# Patient Record
Sex: MALE | Race: OTHER
[De-identification: names, ages, dates, MRNs, and addresses within clinical notes are randomized per-mention and may not be internally consistent; named-entity substitution may affect disease eponyms.]

---

## 2018-09-21 PROCEDURE — 0FT44ZZ RESECTION OF GALLBLADDER, PERCUTANEOUS ENDOSCOPIC APPROACH: ICD-10-PCS | Performed by: SURGERY

## 2018-09-22 ENCOUNTER — HOSPITAL ENCOUNTER (INPATIENT)
Dept: HOSPITAL 54 - ER | Age: 28
LOS: 3 days | Discharge: HOME | DRG: 418 | End: 2018-09-25
Attending: INTERNAL MEDICINE | Admitting: INTERNAL MEDICINE
Payer: SELF-PAY

## 2018-09-22 VITALS — DIASTOLIC BLOOD PRESSURE: 65 MMHG | SYSTOLIC BLOOD PRESSURE: 128 MMHG

## 2018-09-22 VITALS — HEIGHT: 70 IN | WEIGHT: 270 LBS | BODY MASS INDEX: 38.65 KG/M2

## 2018-09-22 VITALS — SYSTOLIC BLOOD PRESSURE: 114 MMHG | DIASTOLIC BLOOD PRESSURE: 89 MMHG

## 2018-09-22 VITALS — DIASTOLIC BLOOD PRESSURE: 74 MMHG | SYSTOLIC BLOOD PRESSURE: 152 MMHG

## 2018-09-22 DIAGNOSIS — E87.6: ICD-10-CM

## 2018-09-22 DIAGNOSIS — I97.52: ICD-10-CM

## 2018-09-22 DIAGNOSIS — D72.829: ICD-10-CM

## 2018-09-22 DIAGNOSIS — K76.0: ICD-10-CM

## 2018-09-22 DIAGNOSIS — E66.9: ICD-10-CM
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
## 2018-09-22 DIAGNOSIS — K80.00: Primary | ICD-10-CM
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
## 2018-09-22 LAB
ALBUMIN SERPL BCP-MCNC: 4.2 G/DL (ref 3.4–5)
ALP SERPL-CCNC: 71 U/L (ref 46–116)
ALT SERPL W P-5'-P-CCNC: 42 U/L (ref 12–78)
APTT PPP: 22 SEC (ref 23–34)
AST SERPL W P-5'-P-CCNC: 18 U/L (ref 15–37)
BASOPHILS # BLD AUTO: 0.2 /CMM (ref 0–0.2)
BASOPHILS NFR BLD AUTO: 1.7 % (ref 0–2)
BILIRUB DIRECT SERPL-MCNC: 0.1 MG/DL (ref 0–0.2)
BILIRUB SERPL-MCNC: 0.8 MG/DL (ref 0.2–1)
BILIRUB UR QL STRIP: (no result)
BUN SERPL-MCNC: 16 MG/DL (ref 7–18)
CALCIUM SERPL-MCNC: 10 MG/DL (ref 8.5–10.1)
CHLORIDE SERPL-SCNC: 102 MMOL/L (ref 98–107)
CO2 SERPL-SCNC: 24 MMOL/L (ref 21–32)
COLOR UR: (no result)
CREAT SERPL-MCNC: 1.2 MG/DL (ref 0.6–1.3)
EOSINOPHIL NFR BLD AUTO: 0.1 % (ref 0–6)
GLUCOSE SERPL-MCNC: 130 MG/DL (ref 74–106)
HCT VFR BLD AUTO: 45 % (ref 39–51)
HGB BLD-MCNC: 14.9 G/DL (ref 13.5–17.5)
INR PPP: 0.95 (ref 0.85–1.15)
KETONES UR STRIP-MCNC: >=160 MG/DL
LIPASE SERPL-CCNC: 102 U/L (ref 73–393)
LYMPHOCYTES NFR BLD AUTO: 1.3 /CMM (ref 0.8–4.8)
LYMPHOCYTES NFR BLD AUTO: 9.2 % (ref 20–44)
MCHC RBC AUTO-ENTMCNC: 33 G/DL (ref 31–36)
MCV RBC AUTO: 83 FL (ref 80–96)
MONOCYTES NFR BLD AUTO: 0.5 /CMM (ref 0.1–1.3)
MONOCYTES NFR BLD AUTO: 3.7 % (ref 2–12)
NEUTROPHILS # BLD AUTO: 12.6 /CMM (ref 1.8–8.9)
NEUTROPHILS NFR BLD AUTO: 85.3 % (ref 43–81)
PH UR STRIP: 8.5 [PH] (ref 5–8)
PLATELET # BLD AUTO: 283 /CMM (ref 150–450)
POTASSIUM SERPL-SCNC: 3.4 MMOL/L (ref 3.5–5.1)
PROT SERPL-MCNC: 8.1 G/DL (ref 6.4–8.2)
PROT UR QL STRIP: 100 MG/DL
RBC # BLD AUTO: 5.37 MIL/UL (ref 4.5–6)
RBC #/AREA URNS HPF: (no result) /HPF (ref 0–2)
RDW COEFFICIENT OF VARIATION: 11.9 (ref 11.5–15)
SODIUM SERPL-SCNC: 136 MMOL/L (ref 136–145)
UROBILINOGEN UR STRIP-MCNC: 1 EU/DL
WBC #/AREA URNS HPF: (no result) /HPF (ref 0–3)
WBC NRBC COR # BLD AUTO: 14.6 K/UL (ref 4.3–11)

## 2018-09-22 PROCEDURE — A4606 OXYGEN PROBE USED W OXIMETER: HCPCS

## 2018-09-22 PROCEDURE — A6402 STERILE GAUZE <= 16 SQ IN: HCPCS

## 2018-09-22 PROCEDURE — A6403 STERILE GAUZE>16 <= 48 SQ IN: HCPCS

## 2018-09-22 PROCEDURE — Z7610: HCPCS

## 2018-09-22 PROCEDURE — A6253 ABSORPT DRG > 48 SQ IN W/O B: HCPCS

## 2018-09-22 RX ADMIN — SODIUM CHLORIDE PRN MLS/HR: 9 INJECTION, SOLUTION INTRAVENOUS at 14:11

## 2018-09-22 RX ADMIN — PIPERACILLIN SODIUM AND TAZOBACTAM SODIUM SCH MLS/HR: .375; 3 INJECTION, POWDER, LYOPHILIZED, FOR SOLUTION INTRAVENOUS at 19:53

## 2018-09-22 RX ADMIN — Medication PRN MG: at 14:20

## 2018-09-22 RX ADMIN — PIPERACILLIN SODIUM AND TAZOBACTAM SODIUM SCH MLS/HR: .375; 3 INJECTION, POWDER, LYOPHILIZED, FOR SOLUTION INTRAVENOUS at 14:23

## 2018-09-22 RX ADMIN — Medication PRN EACH: at 17:41

## 2018-09-22 NOTE — NUR
DR LOBATO AT BEDSIDE TALKING TO PATIENT. CANCEL HIDA SCAN PER DR LOBATO.

PATIENT FOR LAPAROSCOPIC CHOLECYSTECTOMY POSSIBLE OPEN TOMORROW,PER DR LOBATO.

## 2018-09-22 NOTE — NUR
RN INITIAL NOTES:

RECEIVED REPORT FROM NORA ZHAO. MET WITH PT AT BED SIDE, PT IS A/O X4, ON RA RESPIRATION 
EVEN AND UNLABORED, PT STATED HE'S PAIN WAS A LOT BETTER 1/10, CLAIMED HE DOESNT NEED ANY 
PAIN MEDICATION AT THIS TIME, AND WILL CALL IF HE NEEDS IT. FRIENDS AT BED SIDE. PT AWARE OF 
THE SURGERY TOMORROW, CONSENT FOR PROCEDURE, ANESTHESIA, AND BLOOD OBTAINED BY CHELLY RN. PT 
MADE AWARE THAT HE WILL BE NPO AFTER MIDNIGHT. PT REFUSED IVF, STATED HE'S BEEN DRINKING 
LITERS OF WATER. EDUCATION PROVIDED TO THE PT. PT HAS RIGHT HAND G 20 PATENT AND FLUSHING 
WELL, ON HL, NO S/S OF INFILTRATION NOTED. SAFETY PRECAUTIONS FOR FALL INITIATED, CALL LIGHT 
IN REACH, WILL CONTINUE MONITORING PT.

## 2018-09-22 NOTE — NUR
MS RN CLOSING NOTES



ALL DUE MEDS GIVEN, NEEDS MET AND RENDERED. PT REMAINS A/O X4, AFEBRILE. RESPIRATIONS ARE 
EVEN AND UNLABORED, NOT IN ANY ACUTE DISTRESS NOTED. DENIES ANY CHEST PIN, SOB, N/V. PAIN 
HAS BEEN CONTROLLED TO RIGHT FLANK. IV SITE INTACT, NO INFILTRATION NOTED. DRESSING KEPT 
CLEAN AND DRY. IV FLUIDS RUNNING AND TOLERATING WELL. SAFETY MEASURES ARE IN PLACE. REMINDED 
PT TO USE CALL LIGHT WHEN ASSISTANCE IS NEEDED, CALL LIGHT IS LEFT WITHIN REACH. WILL 
ENDORSE TO NEXT SHIFT FOR CONTINUITY OF CARE.

## 2018-09-22 NOTE — NUR
Pt dozing appears more comfortable skin warm/dry/pink states pain"its a little 
better but still hurts" updated w/plan

## 2018-09-22 NOTE — NUR
RN NOTES:

OFFERED PAIN MEDICATION BUT PT STATED HE'S OKAY FOR NOW, HE WILL CALL WHEN HE NEEDS THE 
MEDICINE

## 2018-09-22 NOTE — NUR
MS RN ADMISSION NOTE



PT ARRIVED TO [facility] VIA GURNEY IN STABLE CONDITION ACCOMPANIED BY FRIEND. PT ABLE TO 
AMBULATE TO BED. PT IS A/O X4, AFEBRILE. RESPIRATIONS ARE EVEN AND UNLABORED, NOT IN ANY 
ACUTE DISTRESS NOTED. NO C/O SOB, N/V. PT C/O PAIN 10/10 TO RIGHT ABDOMEN/FLANK AND WILL 
MANAGE PAIN ACCORDINGLY. ABDOMEN IS TENDER RUQ/RLQ W/ PAIN UPON PALPATION. PT DENIES ANY 
BLADDER DISCOMFORT. IV SITE TO RIGHT HANG G20, INTACT. NO INFILTRATION NOTED. DRESSING KEPT 
CLEAN AND DRY. NO SKIN ISSUES NOTED. ID BAND REPLACED. SAFETY MEASURES ARE IN PLACE. 
INSTRUCTED PT TO USE CALL LIGHT WHEN ASSISTANCE IS NEEDED, CALL LIGHT IS LEFT WITHIN REACH. 
DR. MELLO MADE AWARE OF ADMISSION. WILL CONTINUE TO MONITOR THROUGHOUT SHIFT FOR 
CONTINUITY OF CARE.

## 2018-09-23 VITALS — DIASTOLIC BLOOD PRESSURE: 71 MMHG | SYSTOLIC BLOOD PRESSURE: 145 MMHG

## 2018-09-23 VITALS — DIASTOLIC BLOOD PRESSURE: 60 MMHG | SYSTOLIC BLOOD PRESSURE: 117 MMHG

## 2018-09-23 VITALS — SYSTOLIC BLOOD PRESSURE: 117 MMHG | DIASTOLIC BLOOD PRESSURE: 60 MMHG

## 2018-09-23 VITALS — SYSTOLIC BLOOD PRESSURE: 117 MMHG | DIASTOLIC BLOOD PRESSURE: 57 MMHG

## 2018-09-23 VITALS — DIASTOLIC BLOOD PRESSURE: 65 MMHG | SYSTOLIC BLOOD PRESSURE: 118 MMHG

## 2018-09-23 VITALS — DIASTOLIC BLOOD PRESSURE: 60 MMHG | SYSTOLIC BLOOD PRESSURE: 122 MMHG

## 2018-09-23 VITALS — DIASTOLIC BLOOD PRESSURE: 65 MMHG | SYSTOLIC BLOOD PRESSURE: 117 MMHG

## 2018-09-23 VITALS — SYSTOLIC BLOOD PRESSURE: 118 MMHG | DIASTOLIC BLOOD PRESSURE: 60 MMHG

## 2018-09-23 VITALS — DIASTOLIC BLOOD PRESSURE: 74 MMHG | SYSTOLIC BLOOD PRESSURE: 119 MMHG

## 2018-09-23 LAB
ALBUMIN SERPL BCP-MCNC: 3.4 G/DL (ref 3.4–5)
ALP SERPL-CCNC: 56 U/L (ref 46–116)
ALT SERPL W P-5'-P-CCNC: 29 U/L (ref 12–78)
AST SERPL W P-5'-P-CCNC: 15 U/L (ref 15–37)
BASOPHILS # BLD AUTO: 0 /CMM (ref 0–0.2)
BASOPHILS NFR BLD AUTO: 0.1 % (ref 0–2)
BILIRUB SERPL-MCNC: 0.8 MG/DL (ref 0.2–1)
BUN SERPL-MCNC: 9 MG/DL (ref 7–18)
CALCIUM SERPL-MCNC: 9 MG/DL (ref 8.5–10.1)
CHLORIDE SERPL-SCNC: 103 MMOL/L (ref 98–107)
CHOLEST SERPL-MCNC: 106 MG/DL (ref ?–200)
CO2 SERPL-SCNC: 27 MMOL/L (ref 21–32)
CREAT SERPL-MCNC: 1 MG/DL (ref 0.6–1.3)
EOSINOPHIL NFR BLD AUTO: 0.4 % (ref 0–6)
GLUCOSE SERPL-MCNC: 102 MG/DL (ref 74–106)
HCT VFR BLD AUTO: 43 % (ref 39–51)
HDLC SERPL-MCNC: 50 MG/DL (ref 40–60)
HGB BLD-MCNC: 14 G/DL (ref 13.5–17.5)
LDLC SERPL DIRECT ASSAY-MCNC: 56 MG/DL (ref 0–99)
LYMPHOCYTES NFR BLD AUTO: 1.6 /CMM (ref 0.8–4.8)
LYMPHOCYTES NFR BLD AUTO: 15.1 % (ref 20–44)
MAGNESIUM SERPL-MCNC: 1.7 MG/DL (ref 1.8–2.4)
MCHC RBC AUTO-ENTMCNC: 33 G/DL (ref 31–36)
MCV RBC AUTO: 86 FL (ref 80–96)
MONOCYTES NFR BLD AUTO: 0.9 /CMM (ref 0.1–1.3)
MONOCYTES NFR BLD AUTO: 8.5 % (ref 2–12)
NEUTROPHILS # BLD AUTO: 7.9 /CMM (ref 1.8–8.9)
NEUTROPHILS NFR BLD AUTO: 75.9 % (ref 43–81)
PHOSPHATE SERPL-MCNC: 2.8 MG/DL (ref 2.5–4.9)
PLATELET # BLD AUTO: 215 /CMM (ref 150–450)
POTASSIUM SERPL-SCNC: 3.7 MMOL/L (ref 3.5–5.1)
PROT SERPL-MCNC: 7.1 G/DL (ref 6.4–8.2)
RBC # BLD AUTO: 4.93 MIL/UL (ref 4.5–6)
RDW COEFFICIENT OF VARIATION: 12.8 (ref 11.5–15)
SODIUM SERPL-SCNC: 138 MMOL/L (ref 136–145)
TRIGL SERPL-MCNC: 44 MG/DL (ref 30–150)
WBC NRBC COR # BLD AUTO: 10.4 K/UL (ref 4.3–11)

## 2018-09-23 PROCEDURE — 04QY4ZZ REPAIR LOWER ARTERY, PERCUTANEOUS ENDOSCOPIC APPROACH: ICD-10-PCS | Performed by: SURGERY

## 2018-09-23 RX ADMIN — Medication PRN MG: at 22:01

## 2018-09-23 RX ADMIN — MAGNESIUM SULFATE IN DEXTROSE SCH MLS/HR: 10 INJECTION, SOLUTION INTRAVENOUS at 11:21

## 2018-09-23 RX ADMIN — Medication PRN MG: at 18:11

## 2018-09-23 RX ADMIN — PIPERACILLIN SODIUM AND TAZOBACTAM SODIUM SCH MLS/HR: .375; 3 INJECTION, POWDER, LYOPHILIZED, FOR SOLUTION INTRAVENOUS at 14:02

## 2018-09-23 RX ADMIN — PIPERACILLIN SODIUM AND TAZOBACTAM SODIUM SCH MLS/HR: .375; 3 INJECTION, POWDER, LYOPHILIZED, FOR SOLUTION INTRAVENOUS at 10:27

## 2018-09-23 RX ADMIN — Medication PRN MG: at 14:10

## 2018-09-23 RX ADMIN — Medication PRN EACH: at 15:42

## 2018-09-23 RX ADMIN — MAGNESIUM SULFATE IN DEXTROSE SCH MLS/HR: 10 INJECTION, SOLUTION INTRAVENOUS at 12:27

## 2018-09-23 RX ADMIN — SODIUM CHLORIDE PRN MLS/HR: 9 INJECTION, SOLUTION INTRAVENOUS at 02:41

## 2018-09-23 RX ADMIN — Medication PRN MG: at 10:02

## 2018-09-23 RX ADMIN — PIPERACILLIN SODIUM AND TAZOBACTAM SODIUM SCH MLS/HR: .375; 3 INJECTION, POWDER, LYOPHILIZED, FOR SOLUTION INTRAVENOUS at 01:03

## 2018-09-23 RX ADMIN — SODIUM CHLORIDE, SODIUM LACTATE, POTASSIUM CHLORIDE, AND CALCIUM CHLORIDE PRN MLS/HR: .6; .31; .03; .02 INJECTION, SOLUTION INTRAVENOUS at 12:58

## 2018-09-23 RX ADMIN — PIPERACILLIN SODIUM AND TAZOBACTAM SODIUM SCH MLS/HR: .375; 3 INJECTION, POWDER, LYOPHILIZED, FOR SOLUTION INTRAVENOUS at 19:46

## 2018-09-23 RX ADMIN — Medication PRN EACH: at 11:32

## 2018-09-23 RX ADMIN — Medication PRN EACH: at 19:43

## 2018-09-23 NOTE — NUR
MS RN Closing notes



Patient is A/O x4. Breathing on room air, tolerating well, denies SOB. S/P lap 
Cholecystectomy today by Dr. Ramos. Patient's V/S been stable throughout the shift. Abdomen 
incisional sites pain managed by IV PRN Morphine and PO PRN Norco. Low magnesium level, 
supplemented today. Abdomen lap sites covered with mepilex, clean and dry. NOY drain intact 
to bulb suction with bloody fluid output 310ml, drain site with minimal bleeding, covered 
with ABD pad and secured with paper tape. Tolerating full liquids diet, no N/V, will advance 
to regular diet as ordered. Ambulate independently to the bathroom and voided without 
difficulty. Call light within reach. Will endorse to oncoming RN.

## 2018-09-23 NOTE — NUR
MS/RN OPENING NOTES

RECEIVED PATIENT IN BED, ALERT, AWAKE, ABLE TO VERBALIZE NEEDS, FAMILY AT BEDSIDE, PAIN 
MEDICATION MONITORING FOR RELIEF, INFOMRD SOME PAIN MODERATE AND NORCO PO DUE TO BE GIVEN, 
KEPT COMFORTABLE, O2 SAT AT 2L AT 99%M NOY DRAIN WITH MINIMAL DRAIN, WILL CONTINUE TO PROVIDE 
AND MONITOR PATIENT. RECEWIVED ENDORSEMENT FROM AM RN AFTER S/P SX.

## 2018-09-23 NOTE — NUR
Patient is back from OR, s/p Lap cholecystectomy by Dr. Ramos. Lap sites x5 and 1 NOY drain 
with bloody fluid output, patient is awake, complaint of severe pain incisional sites, given 
IV PRN morphine, will reassess. Wife at the bedside. 

-------------------------------------------------------------------------------

Addendum: 09/23/18 at 1038 by FANG CLARK RN

-------------------------------------------------------------------------------

Continuation notes below:

Zosyn 0800 dose non administered, will give next dose at due time, spoke with pharmacy 
waldem[facility].

## 2018-09-24 VITALS — DIASTOLIC BLOOD PRESSURE: 67 MMHG | SYSTOLIC BLOOD PRESSURE: 119 MMHG

## 2018-09-24 VITALS — DIASTOLIC BLOOD PRESSURE: 63 MMHG | SYSTOLIC BLOOD PRESSURE: 118 MMHG

## 2018-09-24 VITALS — SYSTOLIC BLOOD PRESSURE: 118 MMHG | DIASTOLIC BLOOD PRESSURE: 63 MMHG

## 2018-09-24 VITALS — DIASTOLIC BLOOD PRESSURE: 64 MMHG | SYSTOLIC BLOOD PRESSURE: 126 MMHG

## 2018-09-24 LAB
ALBUMIN SERPL BCP-MCNC: 2.8 G/DL (ref 3.4–5)
ALP SERPL-CCNC: 43 U/L (ref 46–116)
ALT SERPL W P-5'-P-CCNC: 42 U/L (ref 12–78)
AST SERPL W P-5'-P-CCNC: 20 U/L (ref 15–37)
BASOPHILS # BLD AUTO: 0 /CMM (ref 0–0.2)
BASOPHILS NFR BLD AUTO: 0.3 % (ref 0–2)
BILIRUB SERPL-MCNC: 0.6 MG/DL (ref 0.2–1)
BUN SERPL-MCNC: 6 MG/DL (ref 7–18)
CALCIUM SERPL-MCNC: 8.2 MG/DL (ref 8.5–10.1)
CHLORIDE SERPL-SCNC: 105 MMOL/L (ref 98–107)
CO2 SERPL-SCNC: 27 MMOL/L (ref 21–32)
CREAT SERPL-MCNC: 1 MG/DL (ref 0.6–1.3)
EOSINOPHIL NFR BLD AUTO: 0.3 % (ref 0–6)
GLUCOSE SERPL-MCNC: 114 MG/DL (ref 74–106)
HCT VFR BLD AUTO: 34 % (ref 39–51)
HGB BLD-MCNC: 11.2 G/DL (ref 13.5–17.5)
LYMPHOCYTES NFR BLD AUTO: 1.3 /CMM (ref 0.8–4.8)
LYMPHOCYTES NFR BLD AUTO: 14 % (ref 20–44)
MAGNESIUM SERPL-MCNC: 1.9 MG/DL (ref 1.8–2.4)
MCHC RBC AUTO-ENTMCNC: 33 G/DL (ref 31–36)
MCV RBC AUTO: 87 FL (ref 80–96)
MONOCYTES NFR BLD AUTO: 0.7 /CMM (ref 0.1–1.3)
MONOCYTES NFR BLD AUTO: 7.6 % (ref 2–12)
NEUTROPHILS # BLD AUTO: 7 /CMM (ref 1.8–8.9)
NEUTROPHILS NFR BLD AUTO: 77.8 % (ref 43–81)
PLATELET # BLD AUTO: 189 /CMM (ref 150–450)
POTASSIUM SERPL-SCNC: 3.8 MMOL/L (ref 3.5–5.1)
PROT SERPL-MCNC: 6.2 G/DL (ref 6.4–8.2)
RBC # BLD AUTO: 3.92 MIL/UL (ref 4.5–6)
RDW COEFFICIENT OF VARIATION: 12.8 (ref 11.5–15)
SODIUM SERPL-SCNC: 140 MMOL/L (ref 136–145)
WBC NRBC COR # BLD AUTO: 9 K/UL (ref 4.3–11)

## 2018-09-24 RX ADMIN — Medication PRN EACH: at 07:42

## 2018-09-24 RX ADMIN — Medication PRN MG: at 04:34

## 2018-09-24 RX ADMIN — Medication PRN MG: at 18:13

## 2018-09-24 RX ADMIN — SODIUM CHLORIDE, SODIUM LACTATE, POTASSIUM CHLORIDE, AND CALCIUM CHLORIDE PRN MLS/HR: .6; .31; .03; .02 INJECTION, SOLUTION INTRAVENOUS at 01:38

## 2018-09-24 RX ADMIN — Medication PRN MG: at 12:05

## 2018-09-24 RX ADMIN — PIPERACILLIN SODIUM AND TAZOBACTAM SODIUM SCH MLS/HR: .375; 3 INJECTION, POWDER, LYOPHILIZED, FOR SOLUTION INTRAVENOUS at 20:49

## 2018-09-24 RX ADMIN — PIPERACILLIN SODIUM AND TAZOBACTAM SODIUM SCH MLS/HR: .375; 3 INJECTION, POWDER, LYOPHILIZED, FOR SOLUTION INTRAVENOUS at 07:43

## 2018-09-24 RX ADMIN — Medication PRN EACH: at 14:20

## 2018-09-24 RX ADMIN — PIPERACILLIN SODIUM AND TAZOBACTAM SODIUM SCH MLS/HR: .375; 3 INJECTION, POWDER, LYOPHILIZED, FOR SOLUTION INTRAVENOUS at 01:36

## 2018-09-24 RX ADMIN — PIPERACILLIN SODIUM AND TAZOBACTAM SODIUM SCH MLS/HR: .375; 3 INJECTION, POWDER, LYOPHILIZED, FOR SOLUTION INTRAVENOUS at 14:07

## 2018-09-24 NOTE — NUR
MS/RN OPENING NOTES



PT RECEIVED WITH EYES CLOSED. RESPONSIVE TO VERBAL STIMULI. ON ROOM AIR, BREATHING EVEN AND 
UNLABORED. DENIES SOB, NOTES ABDOMINAL PAIN AT A TOLERABLE LEVEL 3-4/10. NOY DRAIN WITH 
SEROSANGUINOUS OUTPUT. IVF CURRENTLY ON HOLD. BED IN LOW/LOCKED POSITION WITH CALL LIGHT IN 
REACH AND BILATERAL UPPER SIDE RAILS IN PLACE. WILL CONTINUE TO MONITOR

## 2018-09-24 NOTE — NUR
ms rn initial notes



Received patient in bed, awake, head of bed elevated, on room air and tolerated well.  
Patient is asking for pain medication and will check if meds are due.  NOY drain intact with 
serosanguineous output.  IV intact and patent with IVF infusing well.  Call light with in 
patient reach, will continue to monitor accordingly.

## 2018-09-24 NOTE — NUR
207-1

MS/RN NOTES

PATIENT ABLE TO SLEEP DURING THE NIGHT. ALERT, ORIENTED X3, ABLE TO VERBALIZE NEED, 
MEDICATED FOR PAIN.RESPIRATIONS EVEN AND UNLABORED, SKIN WARM TO TOUCH. PROVIDED FLUIDS. BED 
IN LOCK POSITION, CALL LIGHTS WITHIN REACH. WILL ENDORSE TO AM RN FOR CECILIA.

## 2018-09-24 NOTE — NUR
ms/rn notes

PAIN REPORTED 10/10, PAIN IN ABDOMEN AND RADIATING TO BACK, LAST PAIN MEDICATION GIVEN 
BEFORE BEDTIME, , NOY DRAIN EMPTIED AT 40CC.WITH THICK RED BLOOD.AMBULATORY WITH SUPERVISION

## 2018-09-24 NOTE — NUR
ms rn closing notes



All needs provided, attended, and anticipated. Patient in stable condition. Endorsed to next 
shift RN to continue care.  Call light with in patient reach.

## 2018-09-25 VITALS — DIASTOLIC BLOOD PRESSURE: 79 MMHG | SYSTOLIC BLOOD PRESSURE: 138 MMHG

## 2018-09-25 LAB
BASOPHILS # BLD AUTO: 0 /CMM (ref 0–0.2)
BASOPHILS NFR BLD AUTO: 0.5 % (ref 0–2)
EOSINOPHIL NFR BLD AUTO: 1.1 % (ref 0–6)
HCT VFR BLD AUTO: 35 % (ref 39–51)
HGB BLD-MCNC: 11.8 G/DL (ref 13.5–17.5)
LYMPHOCYTES NFR BLD AUTO: 1.2 /CMM (ref 0.8–4.8)
LYMPHOCYTES NFR BLD AUTO: 20.5 % (ref 20–44)
MCHC RBC AUTO-ENTMCNC: 34 G/DL (ref 31–36)
MCV RBC AUTO: 85 FL (ref 80–96)
MONOCYTES NFR BLD AUTO: 0.7 /CMM (ref 0.1–1.3)
MONOCYTES NFR BLD AUTO: 12.3 % (ref 2–12)
NEUTROPHILS # BLD AUTO: 3.9 /CMM (ref 1.8–8.9)
NEUTROPHILS NFR BLD AUTO: 65.6 % (ref 43–81)
PLATELET # BLD AUTO: 163 /CMM (ref 150–450)
RBC # BLD AUTO: 4.07 MIL/UL (ref 4.5–6)
RDW COEFFICIENT OF VARIATION: 11.6 (ref 11.5–15)
WBC NRBC COR # BLD AUTO: 5.9 K/UL (ref 4.3–11)

## 2018-09-25 RX ADMIN — PIPERACILLIN SODIUM AND TAZOBACTAM SODIUM SCH MLS/HR: .375; 3 INJECTION, POWDER, LYOPHILIZED, FOR SOLUTION INTRAVENOUS at 08:50

## 2018-09-25 RX ADMIN — SODIUM CHLORIDE, SODIUM LACTATE, POTASSIUM CHLORIDE, AND CALCIUM CHLORIDE PRN MLS/HR: .6; .31; .03; .02 INJECTION, SOLUTION INTRAVENOUS at 01:56

## 2018-09-25 RX ADMIN — PIPERACILLIN SODIUM AND TAZOBACTAM SODIUM SCH MLS/HR: .375; 3 INJECTION, POWDER, LYOPHILIZED, FOR SOLUTION INTRAVENOUS at 01:56

## 2018-09-25 RX ADMIN — Medication PRN EACH: at 00:20

## 2018-09-25 NOTE — NUR
MS RN Initial notes



Patient is awake, A/O x 4. Breathing on room air, tolerating room air, no SOB. Abdomen lap 
site dressing intact, clean and dry. NOY drain in place draining minimal sanguineous fluid. 
Denies pain, appears comfortable in bed. Call light within reach.

## 2018-09-25 NOTE — NUR
MS RN DISCHARGED 



PATIENT HAS BEEN CLEARED FOR DISCHARGE HOME BY MD. DR. LOBATO REMOVED NOY DRAIN AND GAUZED 
WAS APPLIED. NO INCISIONAL SITE PHOTO WAS TAKEN PER PATIENT PREFERENCE. DISCHARGE 
INSTRUCTION GIVEN TO PATIENT, PRESCRIPTION MEDICATION EDUCATION AND TEACHING PROVIDED, 
PATIENT VERBALIZED UNDERSTANDING. PATIENT IS AWARE TO FOLLOW UP WITH DR. LOBATO AND FOR 
INCISION STAPLES REMOVAL. BELONGINGS CHECK AND SEND WITH THE PATIENT UPON DC. PATIENT LEFT 
HOSP IN STABLE CONDITION ACCOMPANIED BY GIRLFRIEND VIA PRIVATE CAR.

## 2018-09-25 NOTE — NUR
MS/RN CLOSING NOTES



PT AWAKE SITTING UP IN BED. REMAINS ON ROOM AIR, BREATHING EVEN AND UNLABORED. DENIES SOB, 
ABDOMINAL PAIN TOLERABLE AT 2-3/10. DOES NOT WANT PAIN MEDICATION AT THIS TIME. NOY DRAIN 
WITH SANGUINOUS OUTPUT, APPROX 20CC. IV TO LEFT HAND PATENT AND INTACT RUNNING IVF AS 
ORDERED. NO SIGNIFICANT CHANGES OVERNIGHT. ALL NEEDS MET. KEPT PT COMFORTABLE THROUGHOUT 
SHIFT. BED IN LOW/LOCKED POSITION WITH CALL LIGHT IN REACH AND BILATERAL UPPER SIDE RAILS IN 
PLACE. ENDORSED TO DAY SHIFT RN CECILIA.